# Patient Record
Sex: FEMALE | Race: WHITE | HISPANIC OR LATINO | URBAN - METROPOLITAN AREA
[De-identification: names, ages, dates, MRNs, and addresses within clinical notes are randomized per-mention and may not be internally consistent; named-entity substitution may affect disease eponyms.]

---

## 2020-06-10 ENCOUNTER — TELEPHONE (OUTPATIENT)
Dept: OBSTETRICS AND GYNECOLOGY | Facility: CLINIC | Age: 35
End: 2020-06-10

## 2020-06-10 NOTE — TELEPHONE ENCOUNTER
Pt called wrong facility         ----- Message from Dee Weaver sent at 6/10/2020  2:17 PM CDT -----  Contact: pt  New OB    Can the clinic reply in [facility]:     Who Called: pt. Doesn't speak any English    Date of Positive Preg Test: 5/11/20    1st day of Last Menstrual Cycle: 1/5    List Any Difficulties: no    Preferred Provider:so    What Number to Call Back: [phone] (home)

## 2020-06-11 ENCOUNTER — TELEPHONE (OUTPATIENT)
Dept: OBSTETRICS AND GYNECOLOGY | Facility: CLINIC | Age: 35
End: 2020-06-11

## 2020-06-11 NOTE — TELEPHONE ENCOUNTER
----- Message from Denisha Duran LPN sent at 6/11/2020  2:24 PM CDT -----  Contact: [facility]  Can u call pt only speaks [place]   ----- Message -----  From: Jessy Waters  Sent: 6/11/2020   2:16 PM CDT  To: Marlee WELLS Staff    Type: Patient Call Back    Who called: [facility]    What is the request in detail: calling in regards for the nurse to give the patient a call to get scheduled    Can the clinic reply by [facility]? Call back    Would the patient rather a call back or a response via [facility]? Call back    Best call back number: [phone]